# Patient Record
Sex: MALE | Race: WHITE
[De-identification: names, ages, dates, MRNs, and addresses within clinical notes are randomized per-mention and may not be internally consistent; named-entity substitution may affect disease eponyms.]

---

## 2018-11-06 ENCOUNTER — HOSPITAL ENCOUNTER (EMERGENCY)
Dept: HOSPITAL 25 - UCEAST | Age: 53
Discharge: HOME | End: 2018-11-06
Payer: COMMERCIAL

## 2018-11-06 VITALS — SYSTOLIC BLOOD PRESSURE: 148 MMHG | DIASTOLIC BLOOD PRESSURE: 100 MMHG

## 2018-11-06 DIAGNOSIS — R10.13: Primary | ICD-10-CM

## 2018-11-06 PROCEDURE — 81003 URINALYSIS AUTO W/O SCOPE: CPT

## 2018-11-06 PROCEDURE — 85025 COMPLETE CBC W/AUTO DIFF WBC: CPT

## 2018-11-06 PROCEDURE — G0463 HOSPITAL OUTPT CLINIC VISIT: HCPCS

## 2018-11-06 PROCEDURE — 83690 ASSAY OF LIPASE: CPT

## 2018-11-06 PROCEDURE — 99212 OFFICE O/P EST SF 10 MIN: CPT

## 2018-11-06 PROCEDURE — 80053 COMPREHEN METABOLIC PANEL: CPT

## 2018-11-06 PROCEDURE — 36415 COLL VENOUS BLD VENIPUNCTURE: CPT

## 2018-11-06 NOTE — UC
Abdominal Pain Male HPI





- HPI Summary


HPI Summary: 





The patient is a 53-year-old male who has had intermittent epigastric pain for 

about a year.  It is gotten worse over the past 4 months.  He states that when 

he eats solid foods he will often get severe epigastric pain minutes after 

eating.  He has no nausea.  He is from the only way to relieve his pain is to 

foorce himself to vomit.  He has had no weight loss.  The pain can last for 

hours.  The pain does not wake him up at night.  He has had no blood in his 

vomitus.  He has had no change in bowel habits.  He has a remote history of 

surgical repair of a ventral hernia.  He is on  pre-expsoure prophylactis





- History of Current Complaint


Chief Complaint: UCGI


Stated Complaint: VOMITING AFTER EATING


Time Seen by Provider: 11/06/18 16:55


Hx Obtained From: Patient


Onset/Duration: Sudden Onset, Lasting Hours


Timing: Constant


Severity Initially: Moderate


Severity Currently: None


Pain Intensity: 0


Pain Scale Used: 0-10 Numeric


Location: Epigastric


Radiates: No


Aggravating Factor(s): Food


Alleviating Factor(s): Other - vomiting


Associated Signs And Symptoms: Positive: Vomiting





- Allergies/Home Medications


Allergies/Adverse Reactions: 


 Allergies











Allergy/AdvReac Type Severity Reaction Status Date / Time


 


No Known Allergies Allergy   Verified 11/06/18 16:48














PMH/Surg Hx/FS Hx/Imm Hx


Previously Healthy: Yes





- Surgical History


Surgical History: Yes


Surgery Procedure, Year, and Place: ABD HERNIA REPAIR





- Family History


Known Family History: Positive: Hypertension, Other - dad an alcholic





- Social History


Alcohol Use: Weekly


Substance Use Type: None


Smoking Status (MU): Never Smoked Tobacco





Review of Systems


Constitutional: Negative


Skin: Negative


Eyes: Negative


ENT: Negative


Respiratory: Negative


Cardiovascular: Negative


Gastrointestinal: Abdominal Pain, Other - he forces himself to vomit


Genitourinary: Negative


Motor: Negative


Neurovascular: Negative


Musculoskeletal: Negative


Neurological: Negative


Psychological: Negative


All Other Systems Reviewed And Are Negative: Yes





Physical Exam


Triage Information Reviewed: Yes


Appearance: Well-Appearing, No Pain Distress, Well-Nourished


Vital Signs: 


 Initial Vital Signs











Temp  98.2 F   11/06/18 16:44


 


Pulse  68   11/06/18 16:44


 


Resp  16   11/06/18 16:44


 


BP  148/100   11/06/18 16:44


 


Pulse Ox  97   11/06/18 16:44











Vital Signs Reviewed: Yes


Eyes: Positive: Conjunctiva Clear


ENT: Positive: Hearing grossly normal, TMs normal, Uvula midline, Other - uvula 

slightly swollen.  Negative: Nasal congestion, Nasal drainage, Muffled voice, 

Hoarse voice


Dental Exam: Normal


Neck: Positive: Supple, Nontender, No Lymphadenopathy


Respiratory: Positive: Lungs clear, Normal breath sounds, No respiratory 

distress


Cardiovascular: Positive: RRR, No Murmur


Abdomen Description: Positive: Nontender, No Organomegaly, Soft.  Negative: 

Bruit, CVA Tenderness (R), CVA Tenderness (L), Distended, Guarding, Hernia @, 

Hepatomegaly, McBurney's Point Tenderness, Peritoneal Signs, Pulsatile Mass, 

Splenomegaly


Bowel Sounds: Positive: Present


Musculoskeletal: Positive: ROM Intact, No Edema


Neurological: Positive: Alert


Psychological Exam: Normal


Skin Exam: Normal





Abd Pain Male Course/Dx





- Course


Course Of Treatment: I advised gi referral for consideration of endoscopy





- Differential Dx/Clinical Impression


Provider Diagnoses: post prandial abdominal pain- uncertain cause





Discharge





- Sign-Out/Discharge


Documenting (check all that apply): Patient Departure


All imaging exams completed and their final reports reviewed: No Studies





- Discharge Plan


Condition: Stable


Disposition: HOME


Prescriptions: 


Omeprazole CAP* [Prilosec CAP* 20 MG] 20 mg PO BEDTIME #14 cap.


Patient Education Materials:  Acute Abdominal Pain (ED)


Referrals: 


Malcom Zacarias DO [Doctor of Osteopathy] - As Soon As Possible


Temo Collazo, NP [Primary Care Provider] - 3 Days


Additional Instructions: 


To ER for new or worsening symptoms





blood work is pending





I suggest you see a gastroenterlogist 





It may be a while before you can get into see him





I suggest you see Temo later this week for follow up and to review lab work





Your BP was elevated and should be followed








-----------------------------





MYLANTA  30ml (2 tablespoons) every 2 hours while awake for 3 days


----------------------------





- Billing Disposition and Condition


Condition: STABLE


Disposition: Home

## 2018-11-07 LAB
BASOPHILS # BLD AUTO: 0.1 10^3/UL (ref 0–0.2)
EOSINOPHIL # BLD AUTO: 0.2 10^3/UL (ref 0–0.6)
HCT VFR BLD AUTO: 44 % (ref 42–52)
HGB BLD-MCNC: 14.8 G/DL (ref 14–18)
LYMPHOCYTES # BLD AUTO: 1.8 10^3/UL (ref 1–4.8)
MCH RBC QN AUTO: 30 PG (ref 27–31)
MCHC RBC AUTO-ENTMCNC: 34 G/DL (ref 31–36)
MCV RBC AUTO: 88 FL (ref 80–94)
MONOCYTES # BLD AUTO: 0.5 10^3/UL (ref 0–0.8)
NEUTROPHILS # BLD AUTO: 3.9 10^3/UL (ref 1.5–7.7)
NRBC # BLD AUTO: 0 10^3/UL
NRBC BLD QL AUTO: 0.1
PLATELET # BLD AUTO: 202 10^3/UL (ref 150–450)
RBC # BLD AUTO: 4.95 10^6/UL (ref 4–5.4)
WBC # BLD AUTO: 6.4 10^3/UL (ref 3.5–10.8)

## 2018-11-07 NOTE — UC
- Progress Note


Progress Note: 





please let pt know that his bilirubin level was elevate


it may be due to his vomiting but should should be followed by PCP





Discharge





- Sign-Out/Discharge


Documenting (check all that apply): Post-Discharge Follow Up


All imaging exams completed and their final reports reviewed: No Studies





- Discharge Plan


Condition: Stable


Disposition: HOME


Prescriptions: 


Omeprazole CAP* [Prilosec CAP* 20 MG] 20 mg PO BEDTIME #14 cap.


Patient Education Materials:  Acute Abdominal Pain (ED)


Referrals: 


Malcom Zacarias DO [Doctor of Osteopathy] - As Soon As Possible


Temo Collazo NP [Primary Care Provider] - 3 Days


Additional Instructions: 


To ER for new or worsening symptoms





blood work is pending





I suggest you see a gastroenterlogist 





It may be a while before you can get into see him





I suggest you see Temo later this week for follow up and to review lab work





Your BP was elevated and should be followed








-----------------------------





MYLANTA  30ml (2 tablespoons) every 2 hours while awake for 3 days


----------------------------





- Billing Disposition and Condition


Condition: STABLE


Disposition: Home